# Patient Record
Sex: MALE | Race: WHITE | Employment: OTHER | URBAN - METROPOLITAN AREA
[De-identification: names, ages, dates, MRNs, and addresses within clinical notes are randomized per-mention and may not be internally consistent; named-entity substitution may affect disease eponyms.]

---

## 2017-02-02 ENCOUNTER — OFFICE VISIT (OUTPATIENT)
Dept: FAMILY MEDICINE | Facility: CLINIC | Age: 32
End: 2017-02-02
Payer: COMMERCIAL

## 2017-02-02 VITALS
HEIGHT: 72 IN | TEMPERATURE: 97.8 F | WEIGHT: 171 LBS | RESPIRATION RATE: 16 BRPM | HEART RATE: 83 BPM | BODY MASS INDEX: 23.16 KG/M2 | OXYGEN SATURATION: 97 % | DIASTOLIC BLOOD PRESSURE: 66 MMHG | SYSTOLIC BLOOD PRESSURE: 100 MMHG

## 2017-02-02 DIAGNOSIS — L30.9 DERMATITIS: Primary | ICD-10-CM

## 2017-02-02 LAB
ERYTHROCYTE [DISTWIDTH] IN BLOOD BY AUTOMATED COUNT: 12.5 % (ref 10–15)
HCT VFR BLD AUTO: 46.8 % (ref 40–53)
HGB BLD-MCNC: 15.9 G/DL (ref 13.3–17.7)
MCH RBC QN AUTO: 28.5 PG (ref 26.5–33)
MCHC RBC AUTO-ENTMCNC: 34 G/DL (ref 31.5–36.5)
MCV RBC AUTO: 84 FL (ref 78–100)
PLATELET # BLD AUTO: 205 10E9/L (ref 150–450)
RBC # BLD AUTO: 5.58 10E12/L (ref 4.4–5.9)
WBC # BLD AUTO: 5.8 10E9/L (ref 4–11)

## 2017-02-02 PROCEDURE — 80053 COMPREHEN METABOLIC PANEL: CPT | Performed by: FAMILY MEDICINE

## 2017-02-02 PROCEDURE — 99213 OFFICE O/P EST LOW 20 MIN: CPT | Performed by: FAMILY MEDICINE

## 2017-02-02 PROCEDURE — 84165 PROTEIN E-PHORESIS SERUM: CPT | Performed by: FAMILY MEDICINE

## 2017-02-02 PROCEDURE — 85027 COMPLETE CBC AUTOMATED: CPT | Performed by: FAMILY MEDICINE

## 2017-02-02 PROCEDURE — 36415 COLL VENOUS BLD VENIPUNCTURE: CPT | Performed by: FAMILY MEDICINE

## 2017-02-02 PROCEDURE — 00000402 ZZHCL STATISTIC TOTAL PROTEIN: Performed by: FAMILY MEDICINE

## 2017-02-02 NOTE — MR AVS SNAPSHOT
After Visit Summary   2/2/2017    Bruce Castañeda    MRN: [id number]           Patient Information     Date Of Birth          1985        Visit Information        Provider Department      2/2/2017 9:15 AM Geovani Garcia MD [facility]        Today's Diagnoses     Dermatitis    -  1       Care Instructions    [facility] DERMATOLOGY  [address] [place] [address]    [phone]  Doctors Juliana Schofield and Bruce Leger            Follow-ups after your visit        Who to contact     If you have questions or need follow up information about today's clinic visit or your schedule please contact [facility] directly at [phone].  Normal or non-critical lab and imaging results will be communicated to you by MyChart, letter or phone within 4 business days after the clinic has received the results. If you do not hear from us within 7 days, please contact the clinic through Attendifyhart or phone. If you have a critical or abnormal lab result, we will notify you by phone as soon as possible.  Submit refill requests through [employer] or call your pharmacy and they will forward the refill request to us. Please allow 3 business days for your refill to be completed.          Additional Information About Your Visit        MyChart Information     [employer] gives you secure access to your electronic health record. If you see a primary care provider, you can also send messages to your care team and make appointments. If you have questions, please call your primary care clinic.  If you do not have a primary care provider, please call [phone] and they will assist you.        Care EveryWhere ID     This is your Care EveryWhere ID. This could be used by other organizations to access your [place] medical records  [id number]        Your Vitals Were     Pulse Temperature Respirations Height BMI (Body Mass Index) Pulse Oximetry    83 97.8   F (36.6  C) 16 6' (1.829 m) 23.19 kg/m2 97%       Blood Pressure from Last 3 Encounters:   02/02/17 100/66   12/13/16 110/70   12/06/16 120/82    Weight from Last 3 Encounters:   02/02/17 171 lb (77.565 kg)   12/13/16 173 lb (78.472 kg)   12/06/16 175 lb 8 oz (79.606 kg)              We Performed the Following     CBC with platelets     Comprehensive metabolic panel     Protein electrophoresis        Primary Care Provider    None Doctor, MD       No address on file        Thank you!     Thank you for choosing [facility]  for your care. Our goal is always to provide you with excellent care. Hearing back from our patients is one way we can continue to improve our services. Please take a few minutes to complete the written survey that you may receive in the mail after your visit with us. Thank you!             Your Updated Medication List - Protect others around you: Learn how to safely use, store and throw away your medicines at www.disposemymeds.org.          This list is accurate as of: 2/2/17 10:15 AM.  Always use your most recent med list.                   Brand Name Dispense Instructions for use    predniSONE 20 MG tablet    DELTASONE    10 tablet    Before remicade infusions take 40mg 24 hours prior, 40mg 12 hours prior and 40mg 8 hours prior to infusions (every 6 weeks).       REMICADE 100 MG injection   Generic drug:  inFLIXimab      Inject 10 mg/kg into the vein       triamcinolone 0.1 % cream    KENALOG     Apply topically 2 times daily       trimethoprim-polymyxin b ophthalmic solution    POLYTRIM     1 drop 4 times daily

## 2017-02-02 NOTE — PATIENT INSTRUCTIONS
[facility] DERMATOLOGY  [address] [place] [address]    [phone]  Doctors Juliana Schofield and Bruce Leger

## 2017-02-02 NOTE — NURSING NOTE
Chief Complaint   Patient presents with     Musculoskeletal Problem       Initial /66 mmHg  Pulse 83  Temp(Src) 97.8  F (36.6  C)  Resp 16  Ht 6' (1.829 m)  Wt 171 lb (77.565 kg)  BMI 23.19 kg/m2  SpO2 97% Estimated body mass index is 23.19 kg/(m^2) as calculated from the following:    Height as of this encounter: 6' (1.829 m).    Weight as of this encounter: 171 lb (77.565 kg).  BP completed using cuff size: large  S Gutzman CMA

## 2017-02-02 NOTE — Clinical Note
[address]  [address]  [facility] [id number] [phone]                                                                                                           Bruce SHAFER Gamradt  [address]  [facility] [address]    February 7, 2017      Dear Bruce,    The results of your recent tests were reviewed and are enclosed.     Result was abnormal liver tests are a little elevated. We can recheck these in 3 months  Results for orders placed or performed in visit on 02/02/17   CBC with platelets   Result Value Ref Range    WBC 5.8 4.0 - 11.0 10e9/L    RBC Count 5.58 4.4 - 5.9 10e12/L    Hemoglobin 15.9 13.3 - 17.7 g/dL    Hematocrit 46.8 40.0 - 53.0 %    MCV 84 78 - 100 fl    MCH 28.5 26.5 - 33.0 pg    MCHC 34.0 31.5 - 36.5 g/dL    RDW 12.5 10.0 - 15.0 %    Platelet Count 205 150 - 450 10e9/L   Comprehensive metabolic panel   Result Value Ref Range    Sodium 141 133 - 144 mmol/L    Potassium 3.8 3.4 - 5.3 mmol/L    Chloride 104 94 - 109 mmol/L    Carbon Dioxide 31 20 - 32 mmol/L    Anion Gap 6 3 - 14 mmol/L    Glucose 92 70 - 99 mg/dL    Urea Nitrogen 9 7 - 30 mg/dL    Creatinine 0.74 0.66 - 1.25 mg/dL    GFR Estimate >90  Non  GFR Calc   >60 mL/min/1.7m2    GFR Estimate If Black >90   GFR Calc   >60 mL/min/1.7m2    Calcium 9.4 8.5 - 10.1 mg/dL    Bilirubin Total 0.5 0.2 - 1.3 mg/dL    Albumin 4.0 3.4 - 5.0 g/dL    Protein Total 7.6 6.8 - 8.8 g/dL    Alkaline Phosphatase 55 40 - 150 U/L     (H) 0 - 70 U/L    AST 85 (H) 0 - 45 U/L   Protein electrophoresis   Result Value Ref Range    Albumin Fraction 4.2 3.7 - 5.1 g/dL    Alpha 1 Fraction 0.2 0.2 - 0.4 g/dL    Alpha 2 Fraction 0.6 0.5 - 0.9 g/dL    Beta Fraction 1.0 0.6 - 1.0 g/dL    Gamma Fraction 1.3 0.7 - 1.6 g/dL    Monoclonal Peak 0.0 0.0 g/dL    ELP Interpretation:       Essentially normal electrophoretic pattern.  No monoclonal protein seen.    Pathologic significance requires clinical correlation.  CORIE Bonilla M.D.,   Ph.D., Pathologist ().       Thank you for choosing [facility].  We appreciate the opportunity to serve you and look forward to supporting your healthcare needs in the future.    If you have any questions or concerns, please call me or my staff at [phone].      Sincerely,    Geovani Garcia MD

## 2017-02-02 NOTE — PROGRESS NOTES
SUBJECTIVE:                                                    Bruce Castañeda is a 31 year old male who presents to clinic today for the following health issues:    There are no preventive care reminders to display for this patient.  Health Maintenance reviewed at today's visit patient asked to schedule/complete:   None, Health Maintenance up to date.        Musculoskeletal problem/pain      Duration: ongoing    Description  Location: left hip follow up    Intensity:  moderate    Accompanying signs and symptoms: none    History  Previous similar problem: YES  Previous evaluation:  YES    Precipitating or alleviating factors:  Trauma or overuse: no   Aggravating factors include: sitting, standing, walking and climbing stairs    Therapies tried and outcome: rest/inactivity       PROBLEMS TO ADD ON...    Problem list and histories reviewed & adjusted, as indicated.  Additional history: as documented  Area lateral left hip of some open areas and sl redness was draining not now. Is on meds for inflammatory bowel disease.     Patient Active Problem List   Diagnosis     Hyperlipidemia with target LDL less than 160     Ulcerative rectosigmoiditis without complication (H)     Past Surgical History   Procedure Laterality Date     Appendectomy         Social History   Substance Use Topics     Smoking status: Former Smoker     Types: Cigarettes     Smokeless tobacco: Never Used     Alcohol Use: Yes      Comment: socially     Family History   Problem Relation Age of Onset     DIABETES Maternal Grandmother      Family History Negative Mother      Family History Negative Father      Family History Negative Brother            ROS:  CONSTITUTIONAL:NEGATIVE for fever, chills, change in weight  INTEGUMENTARY/SKIN: area lateral left hip with small open areas sl redness minimal induration.     OBJECTIVE:                                                    /66 mmHg  Pulse 83  Temp(Src) 97.8  F (36.6  C)  Resp 16  Ht 6' (1.829  m)  Wt 171 lb (77.565 kg)  BMI 23.19 kg/m2  SpO2 97%  Body mass index is 23.19 kg/(m^2).  GENERAL APPEARANCE: healthy, alert and mild distress  EYES: Eyes grossly normal to inspection, PERRL and conjunctivae and sclerae normal  SKIN: lt lateral hip area several very small open areas some sl erythema, no drainage at this time.     Diagnostic test results:  Diagnostic Test Results:  none      ASSESSMENT/PLAN:                                                    1. Dermatitis  Discussed getlabs and see dermatology  - CBC with platelets  - Comprehensive metabolic panel  - Protein electrophoresis    2- inflammatory bowel disease discussed derm will review medications.   Follow up with Provider - 2-4 week or as needed.      Geovani Garcia MD  [facility]

## 2017-02-03 ENCOUNTER — TRANSFERRED RECORDS (OUTPATIENT)
Dept: HEALTH INFORMATION MANAGEMENT | Facility: CLINIC | Age: 32
End: 2017-02-03

## 2017-02-03 LAB
ALBUMIN SERPL ELPH-MCNC: 4.2 G/DL (ref 3.7–5.1)
ALBUMIN SERPL-MCNC: 4 G/DL (ref 3.4–5)
ALP SERPL-CCNC: 55 U/L (ref 40–150)
ALPHA1 GLOB SERPL ELPH-MCNC: 0.2 G/DL (ref 0.2–0.4)
ALPHA2 GLOB SERPL ELPH-MCNC: 0.6 G/DL (ref 0.5–0.9)
ALT SERPL W P-5'-P-CCNC: 111 U/L (ref 0–70)
ANION GAP SERPL CALCULATED.3IONS-SCNC: 6 MMOL/L (ref 3–14)
AST SERPL W P-5'-P-CCNC: 85 U/L (ref 0–45)
B-GLOBULIN SERPL ELPH-MCNC: 1 G/DL (ref 0.6–1)
BILIRUB SERPL-MCNC: 0.5 MG/DL (ref 0.2–1.3)
BUN SERPL-MCNC: 9 MG/DL (ref 7–30)
CALCIUM SERPL-MCNC: 9.4 MG/DL (ref 8.5–10.1)
CHLORIDE SERPL-SCNC: 104 MMOL/L (ref 94–109)
CO2 SERPL-SCNC: 31 MMOL/L (ref 20–32)
CREAT SERPL-MCNC: 0.74 MG/DL (ref 0.66–1.25)
GAMMA GLOB SERPL ELPH-MCNC: 1.3 G/DL (ref 0.7–1.6)
GFR SERPL CREATININE-BSD FRML MDRD: ABNORMAL ML/MIN/1.7M2
GLUCOSE SERPL-MCNC: 92 MG/DL (ref 70–99)
M PROTEIN SERPL ELPH-MCNC: 0 G/DL
POTASSIUM SERPL-SCNC: 3.8 MMOL/L (ref 3.4–5.3)
PROT PATTERN SERPL ELPH-IMP: NORMAL
PROT SERPL-MCNC: 7.6 G/DL (ref 6.8–8.8)
SODIUM SERPL-SCNC: 141 MMOL/L (ref 133–144)

## 2017-02-06 NOTE — PROGRESS NOTES
Quick Note:    Result was abnormal liver tests are a little elevated. We can recheck these in 3 months  plese send last 5 week sof labs and otes to derm. .     Please let patient know by calling or sending a letter.  ______

## 2017-03-06 DIAGNOSIS — Z11.3 SCREEN FOR STD (SEXUALLY TRANSMITTED DISEASE): Primary | ICD-10-CM

## 2017-04-18 ENCOUNTER — RADIANT APPOINTMENT (OUTPATIENT)
Dept: GENERAL RADIOLOGY | Facility: CLINIC | Age: 32
End: 2017-04-18
Attending: FAMILY MEDICINE
Payer: COMMERCIAL

## 2017-04-18 ENCOUNTER — OFFICE VISIT (OUTPATIENT)
Dept: FAMILY MEDICINE | Facility: CLINIC | Age: 32
End: 2017-04-18
Payer: COMMERCIAL

## 2017-04-18 VITALS
HEART RATE: 74 BPM | DIASTOLIC BLOOD PRESSURE: 74 MMHG | OXYGEN SATURATION: 97 % | SYSTOLIC BLOOD PRESSURE: 106 MMHG | HEIGHT: 72 IN | WEIGHT: 169.25 LBS | TEMPERATURE: 98.7 F | BODY MASS INDEX: 22.92 KG/M2

## 2017-04-18 DIAGNOSIS — K51.30 ULCERATIVE RECTOSIGMOIDITIS WITHOUT COMPLICATION (H): ICD-10-CM

## 2017-04-18 DIAGNOSIS — L08.9 SKIN INFECTION, BACTERIAL: Primary | ICD-10-CM

## 2017-04-18 DIAGNOSIS — B96.89 SKIN INFECTION, BACTERIAL: Primary | ICD-10-CM

## 2017-04-18 DIAGNOSIS — R06.02 SOB (SHORTNESS OF BREATH): ICD-10-CM

## 2017-04-18 DIAGNOSIS — Z11.3 SCREEN FOR STD (SEXUALLY TRANSMITTED DISEASE): ICD-10-CM

## 2017-04-18 LAB
HBV SURFACE AB SERPL IA-ACNC: 0.16 M[IU]/ML
HBV SURFACE AG SERPL QL IA: NONREACTIVE
HCV AB SERPL QL IA: NORMAL
HIV 1+2 AB+HIV1 P24 AG SERPL QL IA: NORMAL

## 2017-04-18 PROCEDURE — 87389 HIV-1 AG W/HIV-1&-2 AB AG IA: CPT | Performed by: FAMILY MEDICINE

## 2017-04-18 PROCEDURE — 86706 HEP B SURFACE ANTIBODY: CPT | Performed by: FAMILY MEDICINE

## 2017-04-18 PROCEDURE — 87591 N.GONORRHOEAE DNA AMP PROB: CPT | Performed by: FAMILY MEDICINE

## 2017-04-18 PROCEDURE — 71020 XR CHEST 2 VW: CPT

## 2017-04-18 PROCEDURE — 99214 OFFICE O/P EST MOD 30 MIN: CPT | Performed by: FAMILY MEDICINE

## 2017-04-18 PROCEDURE — 36415 COLL VENOUS BLD VENIPUNCTURE: CPT | Performed by: FAMILY MEDICINE

## 2017-04-18 PROCEDURE — 87340 HEPATITIS B SURFACE AG IA: CPT | Performed by: FAMILY MEDICINE

## 2017-04-18 PROCEDURE — 86803 HEPATITIS C AB TEST: CPT | Performed by: FAMILY MEDICINE

## 2017-04-18 PROCEDURE — 86780 TREPONEMA PALLIDUM: CPT | Performed by: FAMILY MEDICINE

## 2017-04-18 PROCEDURE — 87491 CHLMYD TRACH DNA AMP PROBE: CPT | Performed by: FAMILY MEDICINE

## 2017-04-18 RX ORDER — CEPHALEXIN 500 MG/1
500 CAPSULE ORAL 3 TIMES DAILY
Qty: 30 CAPSULE | Refills: 0 | Status: SHIPPED | OUTPATIENT
Start: 2017-04-18 | End: 2017-05-09

## 2017-04-18 NOTE — MR AVS SNAPSHOT
After Visit Summary   4/18/2017    Bruce Castañeda    MRN: [id number]           Patient Information     Date Of Birth          1985        Visit Information        Provider Department      4/18/2017 10:40 AM Unruly Cleveland MD [facility]        Today's Diagnoses     Skin infection, bacterial    -  1    SOB (shortness of breath)        Ulcerative rectosigmoiditis without complication (H)        Screen for STD (sexually transmitted disease)           Follow-ups after your visit        Who to contact     If you have questions or need follow up information about today's clinic visit or your schedule please contact [facility] directly at [phone].  Normal or non-critical lab and imaging results will be communicated to you by MyChart, letter or phone within 4 business days after the clinic has received the results. If you do not hear from us within 7 days, please contact the clinic through Chelailehart or phone. If you have a critical or abnormal lab result, we will notify you by phone as soon as possible.  Submit refill requests through [employer] or call your pharmacy and they will forward the refill request to us. Please allow 3 business days for your refill to be completed.          Additional Information About Your Visit        MyChart Information     [employer] gives you secure access to your electronic health record. If you see a primary care provider, you can also send messages to your care team and make appointments. If you have questions, please call your primary care clinic.  If you do not have a primary care provider, please call [phone] and they will assist you.        Care EveryWhere ID     This is your Care EveryWhere ID. This could be used by other organizations to access your [place] medical records  [id number]        Your Vitals Were     Pulse Temperature Height Pulse Oximetry BMI (Body Mass Index)       74 98.7  F (37.1  C) (Oral) 6' (1.829 m)  97% 22.95 kg/m2        Blood Pressure from Last 3 Encounters:   04/18/17 106/74   02/02/17 100/66   12/13/16 110/70    Weight from Last 3 Encounters:   04/18/17 169 lb 4 oz (76.8 kg)   02/02/17 171 lb (77.6 kg)   12/13/16 173 lb (78.5 kg)              We Performed the Following     Anti Treponema     CHLAMYDIA TRACHOMATIS PCR     Hepatitis B Surface Antibody     Hepatitis B surface antigen     Hepatitis C Screen Reflex to HCV RNA Quant and Genotype     HIV Antigen Antibody Combo     NEISSERIA GONORRHOEA PCR          Today's Medication Changes          These changes are accurate as of: 4/18/17 11:59 PM.  If you have any questions, ask your nurse or doctor.               Start taking these medicines.        Dose/Directions    cephALEXin 500 MG capsule   Commonly known as:  KEFLEX   Used for:  Skin infection, bacterial   Started by:  Unruly Cleveland MD        Dose:  500 mg   Take 1 capsule (500 mg) by mouth 3 times daily   Quantity:  30 capsule   Refills:  0            Where to get your medicines      These medications were sent to [facility] [place] [id number]  [address] [facility] [address]     Phone:  [phone]     cephALEXin 500 MG capsule                Primary Care Provider    None Doctor, MD       No address on file        Thank you!     Thank you for choosing [facility]  for your care. Our goal is always to provide you with excellent care. Hearing back from our patients is one way we can continue to improve our services. Please take a few minutes to complete the written survey that you may receive in the mail after your visit with us. Thank you!             Your Updated Medication List - Protect others around you: Learn how to safely use, store and throw away your medicines at www.disposemymeds.org.          This list is accurate as of: 4/18/17 11:59 PM.  Always use your most recent med list.                   Brand Name Dispense Instructions  for use    cephALEXin 500 MG capsule    KEFLEX    30 capsule    Take 1 capsule (500 mg) by mouth 3 times daily       predniSONE 20 MG tablet    DELTASONE    10 tablet    Before remicade infusions take 40mg 24 hours prior, 40mg 12 hours prior and 40mg 8 hours prior to infusions (every 6 weeks).       REMICADE 100 MG injection   Generic drug:  inFLIXimab      Inject 10 mg/kg into the vein       triamcinolone 0.1 % cream    KENALOG     Apply topically 2 times daily       trimethoprim-polymyxin b ophthalmic solution    POLYTRIM     1 drop 4 times daily Reported on 4/18/2017

## 2017-04-18 NOTE — PROGRESS NOTES
SUBJECTIVE:                                                    Bruce Castañeda is a 32 year old male who presents to clinic today for the following health issues:      Skin Infection      Duration: started 1 year ago and within past few months worsen     Description  Location: side of nose and tip of nose, dry skin, cracking skin, sore, warmth to touch    IIntensity:  mild    Accompanying signs and symptoms: redness,     History (similar episodes/previous evaluation): none    Precipitating or alleviating factors:  New exposures:  None  Recent travel: no      Therapies tried and outcome: Aquaphor outcome: not helpful      Work around wood dust. Wear dust mask.   Started a year ago - around nose fold some scab and crack.   Soreness present.   Feel warmth.   Redness of nose - 2-3 weeks.  Ulcerative colitis. On remicade. Gets prednisone before remicade infusion.      Sometime feels winded even with normal activity. Going upstairs, feeling tired. On regular basis similar symptoms.     Problem list and histories reviewed & adjusted, as indicated.  Additional history: as documented    Labs reviewed in EPIC    Reviewed and updated as needed this visit by clinical staff       Reviewed and updated as needed this visit by Provider    Social History     Social History     Marital status: Single     Spouse name: N/A     Number of children: N/A     Years of education: N/A     Social History Main Topics     Smoking status: Former Smoker     Types: Cigarettes     Smokeless tobacco: Never Used     Alcohol use Yes      Comment: socially     Drug use: No     Sexual activity: Yes     Partners: Female     Other Topics Concern     Parent/Sibling W/ Cabg, Mi Or Angioplasty Before 65f 55m? 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    Review of system: for general, respiratory, CVS, GI and psychiatry negative except for noted above.     EXAM:  /74 (Cuff Size: Adult Regular)  Pulse 74  Temp 98.7  F (37.1  C) (Oral)  Ht 6' (1.829 m)  Wt 169 lb 4 oz (76.8 kg)  SpO2 97%  BMI 22.95 kg/m2  Constitutional: healthy, alert and no distress   Psychiatric: mentation appears normal and affect normal/bright  Cardiovascular: RRR. No murmurs,  Respiratory: negative, Lungs clear. No crackles or wheezing. No tachypnea.   Skin - tip of nose and surrounding tissues swollen and erythematous, tender to touch, no pustules.     ASSESSMENT / PLAN:   (L08.9,  B96.89) Skin infection, bacterial  (primary encounter diagnosis)  Comment: due to worsening of nature and being on immunosuppressants  - use oral antibiotics. He understood. Side effects discussed.   Plan: cephALEXin (KEFLEX) 500 MG capsule             (R06.02) SOB (shortness of breath)  Comment: unclear etiology. He had recent basic lab work and his labs were fine. Will obtain xray. If persist, may need further intervention.   Plan: XR Chest 2 Views             (K51.30) Ulcerative rectosigmoiditis without complication (H)  Comment: on immunocompromising medications.   Plan: can affect both above problem.     (Z11.3) Screen for STD (sexually transmitted disease)  Comment:    Plan: NEISSERIA GONORRHOEA PCR, CHLAMYDIA TRACHOMATIS        PCR, HIV Antigen Antibody Combo, Anti         Treponema, Hepatitis B surface antigen,         Hepatitis B Surface Antibody, Hepatitis C         Screen Reflex to HCV RNA Quant and Genotype

## 2017-04-18 NOTE — NURSING NOTE
Chief Complaint   Patient presents with     Eval/Assessment     infection around nose        Initial /74 (Cuff Size: Adult Regular)  Pulse 74  Temp 98.7  F (37.1  C) (Oral)  Ht 6' (1.829 m)  Wt 169 lb 4 oz (76.8 kg)  SpO2 97%  BMI 22.95 kg/m2 Estimated body mass index is 22.95 kg/(m^2) as calculated from the following:    Height as of this encounter: 6' (1.829 m).    Weight as of this encounter: 169 lb 4 oz (76.8 kg).  Medication Reconciliation: complete     Michelle Oliver, CMA

## 2017-04-19 LAB
C TRACH DNA SPEC QL NAA+PROBE: NORMAL
N GONORRHOEA DNA SPEC QL NAA+PROBE: NORMAL
SPECIMEN SOURCE: NORMAL
SPECIMEN SOURCE: NORMAL
T PALLIDUM IGG+IGM SER QL: NEGATIVE

## 2017-05-08 NOTE — PROGRESS NOTES
SUBJECTIVE:                                                    Bruce Castañeda is a 32 year old male who presents to clinic today for the following health issues:    Pt has had a nose rash for around a year. It initially started as dryness and cracking. The rash then spread. 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 While on course of Keflex rash improved. No new products. He uses aquphor. He feels that his nostrils are congested. No seasonal allergies.    Pt also has left scrotal rash and rash around the anus for a few weeks. He showed the rash to his GI, which recommended using desitin. Desitin improves the itching but not the rash. No new products. No fever or chills.   Pt has UC and on remicaide and prednisone prior to infusion.     Problem list and histories reviewed & adjusted, as indicated.  Additional history: as documented    Labs reviewed in EPIC    Reviewed and updated as needed this visit by clinical staff       Reviewed and updated as needed this visit by Provider         ROS:  Constitutional, HEENT, cardiovascular, pulmonary, gi and gu systems are negative, except as otherwise noted.    OBJECTIVE:                                                    /72 (BP Location: Right arm, Patient Position: Chair, Cuff Size: Adult Regular)  Pulse 84  Temp 97.1  F (36.2  C) (Oral)  Wt 167 lb (75.8 kg)  SpO2 99%  BMI 22.65 kg/m2  Body mass index is 22.65 kg/(m^2).  GENERAL: healthy, alert and no distress  EYES: Eyes grossly normal to inspection  HENT: bilateral nostrils with inflammation and erythema   RECTAL (male): normal sphincter tone, no rectal masses, prostate normal size, smooth, nontender without nodules or masses  SKIN: left scrotum with erythematous plaques with surrounding scaling, perirectal region with diffuse erythema; nose with diffuse erythema, edema and peripheral scaling     Diagnostic Test Results:  none      ASSESSMENT/PLAN:                                                       ## Rash and nonspecific skin eruption  - H/o UC on immunosuppressants who currently presents with rash on the left scrotal region and perianal region. KOH of the perianal region was normal. D/d include contact dermatitis vs psoriasis. Recommended to use triamcinolone BID X 14 days and f/u with Dermatology for further evaluation.   - KOH prep (skin, hair or nails only)  - KOH prep (skin, hair or nails only)  - Wound Culture Aerobic Bacterial  - Gram stain    ## Nose infection  - KOH negative, Wound culture pending; previously mildly improved with Keflex X 10 days. Will do course of Bactrim.   - sulfamethoxazole-trimethoprim (BACTRIM DS/SEPTRA DS) 800-160 MG per tablet; Take 1 tablet by mouth 2 times daily for 10 days  Dispense: 20 tablet; Refill: 0    ## Screen for STD (sexually transmitted disease)  - Pt wanted HSV testing   - Herpes Simplex Virus 1 and 2 IgG; Future    ## Encounter for immunization  - HEPATITIS B VACCINE,ADULT,IM  - ADMIN 1st VACCINE    Kehinde Hanley MD  [facility]

## 2017-05-09 ENCOUNTER — OFFICE VISIT (OUTPATIENT)
Dept: FAMILY MEDICINE | Facility: CLINIC | Age: 32
End: 2017-05-09
Payer: COMMERCIAL

## 2017-05-09 VITALS
HEART RATE: 84 BPM | SYSTOLIC BLOOD PRESSURE: 115 MMHG | BODY MASS INDEX: 22.65 KG/M2 | OXYGEN SATURATION: 99 % | WEIGHT: 167 LBS | TEMPERATURE: 97.1 F | DIASTOLIC BLOOD PRESSURE: 72 MMHG

## 2017-05-09 DIAGNOSIS — J34.89 NOSE IRRITATION: ICD-10-CM

## 2017-05-09 DIAGNOSIS — Z23 ENCOUNTER FOR IMMUNIZATION: ICD-10-CM

## 2017-05-09 DIAGNOSIS — Z11.3 SCREEN FOR STD (SEXUALLY TRANSMITTED DISEASE): ICD-10-CM

## 2017-05-09 DIAGNOSIS — R21 RASH AND NONSPECIFIC SKIN ERUPTION: Primary | ICD-10-CM

## 2017-05-09 DIAGNOSIS — K51.30 ULCERATIVE RECTOSIGMOIDITIS WITHOUT COMPLICATION (H): ICD-10-CM

## 2017-05-09 LAB
GRAM STN SPEC: ABNORMAL
KOH PREP SPEC: NORMAL
KOH PREP SPEC: NORMAL
Lab: ABNORMAL
MICRO REPORT STATUS: ABNORMAL
MICRO REPORT STATUS: NORMAL
MICRO REPORT STATUS: NORMAL
SPECIMEN SOURCE: ABNORMAL
SPECIMEN SOURCE: NORMAL
SPECIMEN SOURCE: NORMAL

## 2017-05-09 PROCEDURE — 87220 TISSUE EXAM FOR FUNGI: CPT | Mod: 59 | Performed by: FAMILY MEDICINE

## 2017-05-09 PROCEDURE — 90746 HEPB VACCINE 3 DOSE ADULT IM: CPT | Performed by: FAMILY MEDICINE

## 2017-05-09 PROCEDURE — 90471 IMMUNIZATION ADMIN: CPT | Performed by: FAMILY MEDICINE

## 2017-05-09 PROCEDURE — 87186 SC STD MICRODIL/AGAR DIL: CPT | Performed by: FAMILY MEDICINE

## 2017-05-09 PROCEDURE — 99214 OFFICE O/P EST MOD 30 MIN: CPT | Mod: 25 | Performed by: FAMILY MEDICINE

## 2017-05-09 PROCEDURE — 87070 CULTURE OTHR SPECIMN AEROBIC: CPT | Performed by: FAMILY MEDICINE

## 2017-05-09 PROCEDURE — 87205 SMEAR GRAM STAIN: CPT | Performed by: FAMILY MEDICINE

## 2017-05-09 RX ORDER — SULFAMETHOXAZOLE/TRIMETHOPRIM 800-160 MG
1 TABLET ORAL 2 TIMES DAILY
Qty: 20 TABLET | Refills: 0 | Status: SHIPPED | OUTPATIENT
Start: 2017-05-09 | End: 2017-05-19

## 2017-05-09 RX ORDER — TRIAMCINOLONE ACETONIDE 1 MG/G
OINTMENT TOPICAL
Qty: 15 G | Refills: 0 | Status: SHIPPED | OUTPATIENT
Start: 2017-05-09 | End: 2017-05-09

## 2017-05-09 NOTE — MR AVS SNAPSHOT
After Visit Summary   5/9/2017    Bruce Castañeda    MRN: [id number]           Patient Information     Date Of Birth          1985        Visit Information        Provider Department      5/9/2017 8:00 AM Kehinde Hanley MD [facility]        Today's Diagnoses     Rash and nonspecific skin eruption    -  1    Nose infection        Ulcerative rectosigmoiditis without complication (H)        Screen for STD (sexually transmitted disease)        Encounter for immunization           Follow-ups after your visit        Future tests that were ordered for you today     Open Future Orders        Priority Expected Expires Ordered    Herpes Simplex Virus 1 and 2 IgG Routine  5/9/2018 5/9/2017            Who to contact     If you have questions or need follow up information about today's clinic visit or your schedule please contact [facility] directly at [phone].  Normal or non-critical lab and imaging results will be communicated to you by [employer], letter or phone within 4 business days after the clinic has received the results. If you do not hear from us within 7 days, please contact the clinic through [employer] or phone. If you have a critical or abnormal lab result, we will notify you by phone as soon as possible.  Submit refill requests through [employer] or call your pharmacy and they will forward the refill request to us. Please allow 3 business days for your refill to be completed.          Additional Information About Your Visit        MyChart Information     [employer] gives you secure access to your electronic health record. If you see a primary care provider, you can also send messages to your care team and make appointments. If you have questions, please call your primary care clinic.  If you do not have a primary care provider, please call [phone] and they will assist you.        Care EveryWhere ID     This is your Care EveryWhere ID. This could be used by  other organizations to access your [place] medical records  [id number]        Your Vitals Were     Pulse Temperature Pulse Oximetry BMI (Body Mass Index)          84 97.1  F (36.2  C) (Oral) 99% 22.65 kg/m2         Blood Pressure from Last 3 Encounters:   05/09/17 115/72   04/18/17 106/74   02/02/17 100/66    Weight from Last 3 Encounters:   05/09/17 167 lb (75.8 kg)   04/18/17 169 lb 4 oz (76.8 kg)   02/02/17 171 lb (77.6 kg)              We Performed the Following     ADMIN 1st VACCINE     Gram stain     HEPATITIS B VACCINE,ADULT,IM     RAYSA prep (skin, hair or nails only)     KOH prep (skin, hair or nails only)     Wound Culture Aerobic Bacterial          Today's Medication Changes          These changes are accurate as of: 5/9/17 12:53 PM.  If you have any questions, ask your nurse or doctor.               Start taking these medicines.        Dose/Directions    sulfamethoxazole-trimethoprim 800-160 MG per tablet   Commonly known as:  BACTRIM DS/SEPTRA DS   Used for:  Nose irritation   Started by:  Kehinde Hanley MD        Dose:  1 tablet   Take 1 tablet by mouth 2 times daily for 10 days   Quantity:  20 tablet   Refills:  0            Where to get your medicines      These medications were sent to [facility] [address]  [address] [facility] [address]     Phone:  [phone]     sulfamethoxazole-trimethoprim 800-160 MG per tablet                Primary Care Provider    Rachel Washington MD       No address on file        Thank you!     Thank you for choosing [facility]  for your care. Our goal is always to provide you with excellent care. Hearing back from our patients is one way we can continue to improve our services. Please take a few minutes to complete the written survey that you may receive in the mail after your visit with us. Thank you!             Your Updated Medication List - Protect others around you: Learn how to safely use, store  and throw away your medicines at www.disposemymeds.org.          This list is accurate as of: 5/9/17 12:53 PM.  Always use your most recent med list.                   Brand Name Dispense Instructions for use    predniSONE 20 MG tablet    DELTASONE    10 tablet    Before remicade infusions take 40mg 24 hours prior, 40mg 12 hours prior and 40mg 8 hours prior to infusions (every 6 weeks).       REMICADE 100 MG injection   Generic drug:  inFLIXimab      Inject 10 mg/kg into the vein       sulfamethoxazole-trimethoprim 800-160 MG per tablet    BACTRIM DS/SEPTRA DS    20 tablet    Take 1 tablet by mouth 2 times daily for 10 days

## 2017-05-09 NOTE — NURSING NOTE
Screening Questionnaire for Adult Immunization    Are you sick today?   No   Do you have allergies to medications, food, a vaccine component or latex?   No   Have you ever had a serious reaction after receiving a vaccination?   No   Do you have a long-term health problem with heart disease, lung disease, asthma, kidney disease, metabolic disease (e.g. diabetes), anemia, or other blood disorder?   No   Do you have cancer, leukemia, HIV/AIDS, or any other immune system problem?   No   In the past 3 months, have you taken medications that affect  your immune system, such as prednisone, other steroids, or anticancer drugs; drugs for the treatment of rheumatoid arthritis, Crohn s disease, or psoriasis; or have you had radiation treatments?   Yes   Have you had a seizure, or a brain or other nervous system problem?   No   During the past year, have you received a transfusion of blood or blood     products, or been given immune (gamma) globulin or antiviral drug?   No   For women: Are you pregnant or is there a chance you could become        pregnant during the next month?   No   Have you received any vaccinations in the past 4 weeks?   No     Immunization questionnaire was positive for at least one answer.  Notified Dr. Hanley.      MNVFC doesn't apply on this patient    Per orders of Dr. Hanley, injection of  HEP B given by Adria Ibrahim. Patient instructed to remain in clinic for 20 minutes afterwards, and to report any adverse reaction to me immediately.       Screening performed by Adria Ibrahim on 5/9/2017 at 9:27 AM.

## 2017-05-09 NOTE — NURSING NOTE
Chief Complaint   Patient presents with     Nose Problem     F/U nose infection       Initial /72 (BP Location: Right arm, Patient Position: Chair, Cuff Size: Adult Regular)  Pulse 84  Temp 97.1  F (36.2  C) (Oral)  Wt 167 lb (75.8 kg)  SpO2 99%  BMI 22.65 kg/m2 Estimated body mass index is 22.65 kg/(m^2) as calculated from the following:    Height as of 4/18/17: 6' (1.829 m).    Weight as of this encounter: 167 lb (75.8 kg).  Medication Reconciliation: complete     Adria Ibrahim MA

## 2017-05-11 LAB
BACTERIA SPEC CULT: ABNORMAL
Lab: ABNORMAL
MICRO REPORT STATUS: ABNORMAL
MICROORGANISM SPEC CULT: ABNORMAL
SPECIMEN SOURCE: ABNORMAL

## 2017-05-12 RX ORDER — SULFAMETHOXAZOLE/TRIMETHOPRIM 800-160 MG
1 TABLET ORAL 2 TIMES DAILY
Qty: 20 TABLET | Refills: 0 | Status: SHIPPED | OUTPATIENT
Start: 2017-05-12 | End: 2017-05-12

## 2017-05-13 ENCOUNTER — TELEPHONE (OUTPATIENT)
Dept: NURSING | Facility: CLINIC | Age: 32
End: 2017-05-13

## 2017-05-14 NOTE — TELEPHONE ENCOUNTER
"Call Type: Triage Call    Presenting Problem: Bruce calling from [place], [place]. He is traveling  and left home without his RX for Bactrim DS. He is being treated for  an infection \"in his nose\". This is his second round of antibiotics.  Patient is wondering if he can get his prescription called to a  pharmacy there. He will call us back with name and phone number if  he decides to do this. His said the problem he was being treated for  is actually much better already, so he is not certain he will need  the prescription. I told him I would make this note and he will call  back when he decides how he wishes to proceed.  Triage Note:  Guideline Title: No Guideline - Advice Per Reference (Adult)  Recommended Disposition: Provide Home/Self Care  Original Inclination: Wanted to speak with a nurse  Override Disposition:  Intended Action: Patient does not know  Physician Contacted: No  PROVIDE HOME/SELF CARE ?  YES  SEE ED IMMEDIATELY ? NO  CALL POISON CENTER IMMEDIATELY ? NO  CALL LOCAL AGENCY IMMEDIATELY ? NO  SEE DENTIST WITHIN 4 HOURS ? NO  SEE DENTIST WITHIN 24 HOURS ? NO  CALL LOCAL AGENCY WITHIN 24 HOURS ? NO  SEE DENTIST WITHIN 72 HOURS ? NO  ACTIVATE  ? NO  SEE PROVIDER WITHIN 4 HOURS ? NO  SEE PROVIDER WITHIN 24 HOURS ? NO  CALL PROVIDER WITHIN 24 HOURS ? NO  SEE PROVIDER WITHIN 72 HOURS ? NO  SEE PROVIDER WITHIN 2 WEEKS ? NO  CALL PROVIDER WITHIN 72 HOURS ? NO  SEE DENTIST WITHIN 2 WEEKS ? NO  CALL PROVIDER IMMEDIATELY ? NO  Physician Instructions:  Care Advice:  "

## 2017-05-23 ENCOUNTER — TELEPHONE (OUTPATIENT)
Dept: FAMILY MEDICINE | Facility: CLINIC | Age: 32
End: 2017-05-23

## 2017-05-23 DIAGNOSIS — J34.89 INFECTION OF NOSE: Primary | ICD-10-CM

## 2017-05-23 RX ORDER — SULFAMETHOXAZOLE/TRIMETHOPRIM 800-160 MG
1 TABLET ORAL 2 TIMES DAILY
Qty: 8 TABLET | Refills: 0 | Status: SHIPPED | OUTPATIENT
Start: 2017-05-23 | End: 2017-05-23

## 2017-05-23 RX ORDER — SULFAMETHOXAZOLE/TRIMETHOPRIM 800-160 MG
1 TABLET ORAL 2 TIMES DAILY
Qty: 8 TABLET | Refills: 0 | Status: SHIPPED | OUTPATIENT
Start: 2017-05-23

## 2017-05-23 NOTE — TELEPHONE ENCOUNTER
Patient was seen by Dr Hanley and put on a 10 day course of Bactrim for a rash  Started the medication and took Tuesday through Saturday morning.    Patient went out of town and forgot med so had a 5 day lapse in the treatment   Restarted med 3 days ago but only has 2 days left now and is concerned that he should do a full course without interruption  Asking if additional medication should be sent.  Does note some improvement but got worse again during break from med  Please advise.  Camilla Braun RN

## 2017-06-06 ENCOUNTER — OFFICE VISIT (OUTPATIENT)
Dept: FAMILY MEDICINE | Facility: CLINIC | Age: 32
End: 2017-06-06
Payer: COMMERCIAL

## 2017-06-06 VITALS
HEART RATE: 64 BPM | WEIGHT: 168 LBS | TEMPERATURE: 97.7 F | DIASTOLIC BLOOD PRESSURE: 62 MMHG | SYSTOLIC BLOOD PRESSURE: 110 MMHG | OXYGEN SATURATION: 96 % | RESPIRATION RATE: 16 BRPM | BODY MASS INDEX: 22.78 KG/M2

## 2017-06-06 DIAGNOSIS — J34.89 INFECTION OF NOSE: Primary | ICD-10-CM

## 2017-06-06 DIAGNOSIS — R21 RASH AND NONSPECIFIC SKIN ERUPTION: ICD-10-CM

## 2017-06-06 DIAGNOSIS — K51.30 ULCERATIVE RECTOSIGMOIDITIS WITHOUT COMPLICATION (H): ICD-10-CM

## 2017-06-06 PROCEDURE — 99214 OFFICE O/P EST MOD 30 MIN: CPT | Performed by: FAMILY MEDICINE

## 2017-06-06 RX ORDER — CIPROFLOXACIN 500 MG/1
500 TABLET, FILM COATED ORAL 2 TIMES DAILY
Qty: 28 TABLET | Refills: 0 | Status: SHIPPED | OUTPATIENT
Start: 2017-06-06 | End: 2017-06-20

## 2017-06-06 NOTE — MR AVS SNAPSHOT
After Visit Summary   6/6/2017    Bruce Castañeda    MRN: [id number]           Patient Information     Date Of Birth          1985        Visit Information        Provider Department      6/6/2017 7:40 AM Kehinde Hanley MD [facility]        Today's Diagnoses     Infection of nose    -  1       Follow-ups after your visit        Additional Services     INFECTIOUS DISEASE REFERRAL       Your provider has referred you to: [facility]: Adult Specialty and Infusion Clinic - [place] [phone]   [email or web address]  [facility]: [facility] (Infectious Disease and HIV Clinic) - [place] [phone]   [email or web address]    Please be aware that coverage of these services is subject to the terms and limitations of your health insurance plan.  Call member services at your health plan with any benefit or coverage questions.      Please bring the following with you to your appointment:    (1) Any X-Rays, CTs or MRIs which have been performed.  Contact the facility where they were done to arrange for  prior to your scheduled appointment.    (2) List of current medications   (3) This referral request   (4) Any documents/labs given to you for this referral                  Who to contact     If you have questions or need follow up information about today's clinic visit or your schedule please contact [facility] directly at [phone].  Normal or non-critical lab and imaging results will be communicated to you by MyChart, letter or phone within 4 business days after the clinic has received the results. If you do not hear from us within 7 days, please contact the clinic through MyChart or phone. If you have a critical or abnormal lab result, we will notify you by phone as soon as possible.  Submit refill requests through [employer] or call your  pharmacy and they will forward the refill request to us. Please allow 3 business days for your refill to be completed.          Additional Information About Your Visit        [employer] Information     [employer] gives you secure access to your electronic health record. If you see a primary care provider, you can also send messages to your care team and make appointments. If you have questions, please call your primary care clinic.  If you do not have a primary care provider, please call [phone] and they will assist you.        Care EveryWhere ID     This is your Care EveryWhere ID. This could be used by other organizations to access your [place] medical records  [id number]        Your Vitals Were     Pulse Temperature Respirations Pulse Oximetry BMI (Body Mass Index)       64 97.7  F (36.5  C) (Tympanic) 16 96% 22.78 kg/m2        Blood Pressure from Last 3 Encounters:   06/06/17 110/62   05/09/17 115/72   04/18/17 106/74    Weight from Last 3 Encounters:   06/06/17 168 lb (76.2 kg)   05/09/17 167 lb (75.8 kg)   04/18/17 169 lb 4 oz (76.8 kg)              We Performed the Following     INFECTIOUS DISEASE REFERRAL        Primary Care Provider    None Doctor, MD       No address on file        Thank you!     Thank you for choosing [facility]  for your care. Our goal is always to provide you with excellent care. Hearing back from our patients is one way we can continue to improve our services. Please take a few minutes to complete the written survey that you may receive in the mail after your visit with us. Thank you!             Your Updated Medication List - Protect others around you: Learn how to safely use, store and throw away your medicines at www.disposemymeds.org.          This list is accurate as of: 6/6/17  8:24 AM.  Always use your most recent med list.                   Brand Name Dispense Instructions for use    predniSONE 20 MG tablet    DELTASONE    10 tablet    Before remicade infusions  take 40mg 24 hours prior, 40mg 12 hours prior and 40mg 8 hours prior to infusions (every 6 weeks).       REMICADE 100 MG injection   Generic drug:  inFLIXimab      Inject 10 mg/kg into the vein       sulfamethoxazole-trimethoprim 800-160 MG per tablet    BACTRIM DS/SEPTRA DS    8 tablet    Take 1 tablet by mouth 2 times daily

## 2017-06-06 NOTE — NURSING NOTE
Chief Complaint   Patient presents with     Infection     nose       Initial /62 (BP Location: Left arm, Patient Position: Chair, Cuff Size: Adult Regular)  Pulse 64  Temp 97.7  F (36.5  C) (Tympanic)  Resp 16  Wt 168 lb (76.2 kg)  SpO2 96%  BMI 22.78 kg/m2 Estimated body mass index is 22.78 kg/(m^2) as calculated from the following:    Height as of 4/18/17: 6' (1.829 m).    Weight as of this encounter: 168 lb (76.2 kg).  Medication Reconciliation: complete       Adria Ibrahim MA

## 2017-06-06 NOTE — PROGRESS NOTES
SUBJECTIVE:                                                    Bruce Castañeda is a 32 year old male who presents to clinic today for the following health issues:    Previous note - Pt has had a nose rash for around a year. It initially started as dryness and cracking. The rash then spread. 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 While on course of Keflex rash improved. No new products. He uses aquphor. He feels that his nostrils are congested. No seasonal allergies.    Pt also has left scrotal rash and rash around the anus for a few weeks. He showed the rash to his GI, which recommended using desitin. Desitin improves the itching but not the rash. No new products. No fever or chills.   Pt has UC and on remicaide and prednisone prior to infusion.       Update - Pt completed 14 day course of bactrim. Pt notes that there has been some improvement with the abx, however lesions are still present. He places aquophor on the outside and uses nasal saline to clean the nostril. Pt missed his last remacide infusion as he was on the abx. He takes the prednisone before the remicade infusion.   Rash around the rectal area has improved on the triamcinolone. He then noticed a new lesion on his penile area which also improved with the triamcinolone.     Problem list and histories reviewed & adjusted, as indicated.  Additional history: as documented    Labs reviewed in EPIC    Reviewed and updated as needed this visit by clinical staff       Reviewed and updated as needed this visit by Provider         ROS:  Constitutional, HEENT, cardiovascular, pulmonary, gi and gu systems are negative, except as otherwise noted.    OBJECTIVE:                                                    /62 (BP Location: Left arm, Patient Position: Chair, Cuff Size: Adult Regular)  Pulse 64  Temp 97.7  F (36.5  C) (Tympanic)  Resp 16  Wt 168 lb (76.2 kg)  SpO2 96%  BMI 22.78 kg/m2  Body mass index is 22.78 kg/(m^2).  GENERAL:  healthy, alert and no distress  EYES: Eyes grossly normal to inspection  HENT/SKIN: bilateral nostrils with crusting and dried blood, left penis with small skin colored papule     Diagnostic Test Results:  none      ASSESSMENT/PLAN:                                                      ## Infection of nose  - has improved since last time I saw pt however infection continue to be present, I previously used Bactrim, based on cultures I'll do a course of cipro and have pt f/u with ID for further evaluation; we discussed risk of tendon injury, pt is not on prednisone daily, he only takes it prior to remicaide infusion, due to h/o UC I was resistant to use clindamycin due to c.diff risk   - INFECTIOUS DISEASE REFERRAL  - ciprofloxacin (CIPRO) 500 MG tablet; Take 1 tablet (500 mg) by mouth 2 times daily for 14 days  Dispense: 28 tablet; Refill: 0    ## Rash and nonspecific skin eruption  - rash not c/w HSV vs HPV, improved from previous lesions, he now has one lesion on the penis, I recommended to use triamcinolone daily X 5 days and f/u with dermatologist for further evaluation     Kehinde Hanley MD  [facility]

## 2017-07-19 ENCOUNTER — TELEPHONE (OUTPATIENT)
Dept: FAMILY MEDICINE | Facility: CLINIC | Age: 32
End: 2017-07-19

## 2017-07-19 DIAGNOSIS — J34.89 INFECTION OF NOSE: Primary | ICD-10-CM

## 2017-07-19 RX ORDER — CIPROFLOXACIN 500 MG/1
500 TABLET, FILM COATED ORAL 2 TIMES DAILY
Qty: 14 TABLET | Refills: 0 | Status: SHIPPED | OUTPATIENT
Start: 2017-07-19 | End: 2017-07-26

## 2017-07-19 NOTE — TELEPHONE ENCOUNTER
Reason for Call:  Medication or medication refill: ciprofloxacin (CIPRO) 500 MG tablet    Do you use a [facility]?  Name of the pharmacy and phone number for the current request:  [facility] [address] [place] [address] AT [facility]    Name of the medication requested: ciprofloxacin (CIPRO) 500 MG tablet    Other request: Patient states this medication worked while using but after finishing off the medication; the prev symptoms regarding his recent nose infection had returned. Bruce is wanting to get another refill of cipro if possible    Can we leave a detailed message on this number? YES    Phone number patient can be reached at: Home number on file [phone] (home)    Best Time: anytime    Call taken on 7/19/2017 at 9:54 AM by Sindy Mcbride

## 2017-07-19 NOTE — TELEPHONE ENCOUNTER
Called patient and reviewed message per below from Dr. Hanley.    Patient verbalized understanding and in agreement with plan.    Reviewed infectious disease referral with patient and encouraged him to check with his insurance company regarding coverage of specialty services.    Patient asked writer what would be done if insurance needs copy of referral and writer informed patient he can call clinic and ask for referral to be mailed to him.    Patient verbalized understanding and in agreement with plan.    IVONNE SuarezN, RN

## 2017-07-24 ENCOUNTER — TELEPHONE (OUTPATIENT)
Dept: FAMILY MEDICINE | Facility: CLINIC | Age: 32
End: 2017-07-24

## 2017-07-24 NOTE — TELEPHONE ENCOUNTER
Routing to referral specialist.    Benita Coleman-Please advise if patient can be referred to requested ID location.  Please follow up with patient.    Thank you!  IVONNE SuarezN, RN

## 2017-07-24 NOTE — TELEPHONE ENCOUNTER
Reason for Call: Request for an order or referral:    Order or referral being requested: INFECTIOUS DISEASE REFERRAL [9016] (Order [id number])    Date needed: as soon as possible    Has the patient been seen by the PCP for this problem? YES    Additional comments: Patient states the current referral to [facility] is not in network through his insurance. He is requesting this referral be changed to [facility] to see Dr. Geovani Dutta. States the referral and any related records / notes can be faxed to [phone]. Please assist. Thanks!    Phone number Patient can be reached at:  Home number on file [phone] (home)    Best Time:  Any    Can we leave a detailed message on this number?  YES    Call taken on 7/24/2017 at 11:12 AM by Mar Souza

## 2017-07-25 NOTE — TELEPHONE ENCOUNTER
Spoke with pt whose insurance is [place] but [facility] is listed on the front of his insurance card as his primary network. Therefore, we should not change locations. If pt want to see Dr Geovani Dutta at [facility] he needs to obtain a referral for Infectious Disease  from an [facility] PCP. [facility] is not his PCP nor his primary network.    Benita AGUSTIN Referral Rep

## 2019-06-10 DIAGNOSIS — R21 RASH AND NONSPECIFIC SKIN ERUPTION: ICD-10-CM

## 2019-06-10 NOTE — TELEPHONE ENCOUNTER
Reason for Call:  Medication or medication refill:    Do you use a [facility]?  Name of the pharmacy and phone number for the current request:  [facility] [address] [place] [address] AT [facility]  [phone]    Name of the medication requested: triamcinolone (KENALOG) 0.1 % cream    Other request: Patient states he is having returning rash symptoms and would like a refill on the medication above. Please follow up. Thanks!    Can we leave a detailed message on this number? YES    Phone number patient can be reached at: Home number on file [phone] (home)    Best Time: Any    Call taken on 6/10/2019 at 3:46 PM by Mar Souza

## 2019-06-10 NOTE — TELEPHONE ENCOUNTER
Attempted to call patient to find out where rash is located.  Office note from 5/9/17 when last prescription was given mentions rash around nose and a scrotal/groin rash.  Directions need to say where rash is located  Also last office visit was 6/6/17.  Left message on machine to call back.  Ask to speak to an RN, let them know it's a return call.    Leave a number and time that you can be reached.   Medication not currently active on patient med list.  Camilla Braun, RN

## 2019-06-11 RX ORDER — TRIAMCINOLONE ACETONIDE 1 MG/G
OINTMENT TOPICAL
Qty: 15 G | Refills: 0 | OUTPATIENT
Start: 2019-06-11

## 2019-06-11 NOTE — TELEPHONE ENCOUNTER
Patient called and spoke with writer.    Per patient:  1. History of rash around rectum that clears with Kenalog cream  2. Current rash present for 1.5 weeks and has not improved  3. Hoping to get refill of Kenalog cream from this clinic because it was last prescribed through provider at this clinic  4. Since last office visit, did establish care with another provider in a different health system because [facility] is not within network with patient's insurance.    Writer explained to patient an office visit would be needed to establish care and to address refill request.    Patient verbalized understanding and stated he has difficulty affording co-pays for provider where he established care.  Writer offered to give patient information on lower cost/sliding fee clinics and patient declined.    Med refused.    ZEENAT Reed, IVONNEN, RN     no ear pain/no hearing difficulty/no vertigo

## 2020-03-02 ENCOUNTER — HEALTH MAINTENANCE LETTER (OUTPATIENT)
Age: 35
End: 2020-03-02

## 2020-12-20 ENCOUNTER — HEALTH MAINTENANCE LETTER (OUTPATIENT)
Age: 35
End: 2020-12-20

## 2021-04-18 ENCOUNTER — HEALTH MAINTENANCE LETTER (OUTPATIENT)
Age: 36
End: 2021-04-18

## 2021-10-03 ENCOUNTER — HEALTH MAINTENANCE LETTER (OUTPATIENT)
Age: 36
End: 2021-10-03

## 2022-05-15 ENCOUNTER — HEALTH MAINTENANCE LETTER (OUTPATIENT)
Age: 37
End: 2022-05-15

## 2022-09-10 ENCOUNTER — HEALTH MAINTENANCE LETTER (OUTPATIENT)
Age: 37
End: 2022-09-10

## 2023-06-03 ENCOUNTER — HEALTH MAINTENANCE LETTER (OUTPATIENT)
Age: 38
End: 2023-06-03